# Patient Record
Sex: MALE | URBAN - METROPOLITAN AREA
[De-identification: names, ages, dates, MRNs, and addresses within clinical notes are randomized per-mention and may not be internally consistent; named-entity substitution may affect disease eponyms.]

---

## 2020-03-10 ENCOUNTER — IMPORTED ENCOUNTER (OUTPATIENT)
Dept: URBAN - METROPOLITAN AREA CLINIC 31 | Facility: CLINIC | Age: 85
End: 2020-03-10

## 2020-03-10 PROBLEM — H25.813: Noted: 2020-03-10

## 2020-03-10 PROCEDURE — 92015 DETERMINE REFRACTIVE STATE: CPT

## 2020-03-10 PROCEDURE — 92004 COMPRE OPH EXAM NEW PT 1/>: CPT

## 2020-03-10 NOTE — PATIENT DISCUSSION
Advanced cataracts OU. Patient happy with his vision presently so no cataract surgery for now. Keratosis. Eval with  doctor.

## 2022-04-02 ASSESSMENT — VISUAL ACUITY
OS_SC: 20/100
OD_CC: 20/50-1
OD_SC: 20/50
OD_PH: SC 20/40 -2
OS_CC: 20/50-2

## 2022-04-02 ASSESSMENT — TONOMETRY
OS_IOP_MMHG: 13
OD_IOP_MMHG: 13

## 2023-09-20 NOTE — PATIENT DISCUSSION
Discussed the importance of blood sugar control in the prevention of ocular complications. Detail Level: Detailed